# Patient Record
Sex: MALE | Race: WHITE | NOT HISPANIC OR LATINO | URBAN - METROPOLITAN AREA
[De-identification: names, ages, dates, MRNs, and addresses within clinical notes are randomized per-mention and may not be internally consistent; named-entity substitution may affect disease eponyms.]

---

## 2023-04-26 DIAGNOSIS — I10 HTN (HYPERTENSION), BENIGN: Primary | ICD-10-CM

## 2023-04-26 RX ORDER — AMLODIPINE BESYLATE 5 MG/1
5 TABLET ORAL DAILY
Qty: 90 TABLET | Refills: 1 | Status: SHIPPED | OUTPATIENT
Start: 2023-04-26 | End: 2024-01-12 | Stop reason: SDUPTHER

## 2023-04-26 RX ORDER — AMLODIPINE BESYLATE 5 MG/1
1 TABLET ORAL DAILY
COMMUNITY
End: 2023-04-26 | Stop reason: SDUPTHER

## 2023-10-01 NOTE — PROGRESS NOTES
Therapy Diagnosis  Assessed    · Shoulder pain, right (719.41) (M25.511)   · Weakness of shoulder (719.61) (R29.898)    Plan of Care    Goals: Goals set and discussed 9/19/23:  By discharge KIRAN ANDRES will achieve the following goals:     Activity Limitation: Will be able to reach overhead in abduction/ scaption without pain or difficulty for improved function with all daily activities; and improve score on QuickDash by at least 7 points for improved daily function., by week 8   Pain: Will report right shoulder pain no worse than 2-3/10 with sleeping and overhead use., by week 8   Range Of Motion/Joint Mobility: Right shoulder painfree AROM improved to at least 150 deg flexion, scaption, and abduction for improved overhead reaching/ lifting for daily activities., by week 8   Strength: Right UE strength improved to 4+5/5 without focal RTC deficits for improved function with overhead reaching, lifting, carrying., by week 8   HEP, Independent and compliant with appropriate HEP for carryover of PT to meet all goals.  , by week 8     Planned interventions include: cryotherapy, education/instruction, home program, therapeutic activities, therapeutic exercises and ultrasound . Focus will be on HEP development for patient to integrate with his biweekly gym workouts; and PT visits to monitor progress/ update home program.   Frequency and duration: 1 time(s) a week, for 8 weeks . Once /week or every other week based on patient preference and progress.   Potential to achieve rehab goals is good      Plan of care was developed with input and agreement by the patient.

## 2023-11-30 ENCOUNTER — DOCUMENTATION (OUTPATIENT)
Dept: PHYSICAL THERAPY | Facility: CLINIC | Age: 58
End: 2023-11-30
Payer: COMMERCIAL

## 2023-11-30 NOTE — PROGRESS NOTES
Physical Therapy    Discharge Summary    Name: Xander Lorenzo  MRN: [id number]  : 1965  Date: 23    Discharge Summary: PT    Discharge Information: Date of discharge 23, Date of last visit 23, Date of evaluation 23, Number of attended visits 1, Referred by Dr Dean David, and Referred for Rotator Cuff Tendonitis    Therapy Summary: See initial evaluation dated 23.  Xander did not schedule follow ups after his first visit. He was to continue with his HEP as call to schedule if needed.     Discharge Status: Did not return after evaluation; see evaluation for last known status.      Rehab Discharge Reason: Failed to schedule and/or keep follow-up appointment(s)

## 2024-01-12 DIAGNOSIS — I10 HTN (HYPERTENSION), BENIGN: ICD-10-CM

## 2024-01-12 RX ORDER — AMLODIPINE BESYLATE 5 MG/1
5 TABLET ORAL DAILY
Qty: 90 TABLET | Refills: 1 | Status: SHIPPED | OUTPATIENT
Start: 2024-01-12

## 2024-04-12 DIAGNOSIS — E78.2 MIXED HYPERLIPIDEMIA: Primary | ICD-10-CM

## 2024-04-12 RX ORDER — ROSUVASTATIN CALCIUM 10 MG/1
10 TABLET, COATED ORAL DAILY
COMMUNITY
End: 2024-04-12 | Stop reason: SDUPTHER

## 2024-04-12 RX ORDER — ROSUVASTATIN CALCIUM 10 MG/1
10 TABLET, COATED ORAL DAILY
Qty: 90 TABLET | Refills: 0 | Status: SHIPPED | OUTPATIENT
Start: 2024-04-12

## 2024-05-02 ENCOUNTER — OFFICE VISIT (OUTPATIENT)
Dept: PRIMARY CARE | Facility: CLINIC | Age: 59
End: 2024-05-02
Payer: COMMERCIAL

## 2024-05-02 VITALS
WEIGHT: 211 LBS | SYSTOLIC BLOOD PRESSURE: 112 MMHG | OXYGEN SATURATION: 97 % | TEMPERATURE: 97.6 F | HEART RATE: 80 BPM | DIASTOLIC BLOOD PRESSURE: 68 MMHG | HEIGHT: 69 IN | BODY MASS INDEX: 31.25 KG/M2

## 2024-05-02 DIAGNOSIS — E78.2 HYPERLIPEMIA, MIXED: ICD-10-CM

## 2024-05-02 DIAGNOSIS — I10 BENIGN ESSENTIAL HYPERTENSION: ICD-10-CM

## 2024-05-02 DIAGNOSIS — Z00.00 HEALTH MAINTENANCE EXAMINATION: Primary | ICD-10-CM

## 2024-05-02 PROCEDURE — 1036F TOBACCO NON-USER: CPT | Performed by: FAMILY MEDICINE

## 2024-05-02 PROCEDURE — 36415 COLL VENOUS BLD VENIPUNCTURE: CPT

## 2024-05-02 PROCEDURE — 3074F SYST BP LT 130 MM HG: CPT | Performed by: FAMILY MEDICINE

## 2024-05-02 PROCEDURE — 80061 LIPID PANEL: CPT

## 2024-05-02 PROCEDURE — 3078F DIAST BP <80 MM HG: CPT | Performed by: FAMILY MEDICINE

## 2024-05-02 PROCEDURE — 80053 COMPREHEN METABOLIC PANEL: CPT

## 2024-05-02 PROCEDURE — 84153 ASSAY OF PSA TOTAL: CPT

## 2024-05-02 PROCEDURE — 85027 COMPLETE CBC AUTOMATED: CPT

## 2024-05-02 PROCEDURE — 99396 PREV VISIT EST AGE 40-64: CPT | Performed by: FAMILY MEDICINE

## 2024-05-02 ASSESSMENT — ENCOUNTER SYMPTOMS
OCCASIONAL FEELINGS OF UNSTEADINESS: 0
LOSS OF SENSATION IN FEET: 0
DEPRESSION: 0

## 2024-05-02 ASSESSMENT — PATIENT HEALTH QUESTIONNAIRE - PHQ9
1. LITTLE INTEREST OR PLEASURE IN DOING THINGS: NOT AT ALL
SUM OF ALL RESPONSES TO PHQ9 QUESTIONS 1 AND 2: 0
2. FEELING DOWN, DEPRESSED OR HOPELESS: NOT AT ALL

## 2024-05-02 ASSESSMENT — PAIN SCALES - GENERAL: PAINLEVEL: 0-NO PAIN

## 2024-05-03 LAB
ALBUMIN SERPL BCP-MCNC: 4.7 G/DL (ref 3.4–5)
ALP SERPL-CCNC: 45 U/L (ref 33–120)
ALT SERPL W P-5'-P-CCNC: 45 U/L (ref 10–52)
ANION GAP SERPL CALC-SCNC: 16 MMOL/L (ref 10–20)
AST SERPL W P-5'-P-CCNC: 28 U/L (ref 9–39)
BILIRUB SERPL-MCNC: 0.8 MG/DL (ref 0–1.2)
BUN SERPL-MCNC: 13 MG/DL (ref 6–23)
CALCIUM SERPL-MCNC: 9.6 MG/DL (ref 8.6–10.6)
CHLORIDE SERPL-SCNC: 101 MMOL/L (ref 98–107)
CHOLEST SERPL-MCNC: 148 MG/DL (ref 0–199)
CHOLESTEROL/HDL RATIO: 3.9
CO2 SERPL-SCNC: 26 MMOL/L (ref 21–32)
CREAT SERPL-MCNC: 0.94 MG/DL (ref 0.5–1.3)
EGFRCR SERPLBLD CKD-EPI 2021: >90 ML/MIN/1.73M*2
ERYTHROCYTE [DISTWIDTH] IN BLOOD BY AUTOMATED COUNT: 12.9 % (ref 11.5–14.5)
GLUCOSE SERPL-MCNC: 98 MG/DL (ref 74–99)
HCT VFR BLD AUTO: 51.1 % (ref 41–52)
HDLC SERPL-MCNC: 38.1 MG/DL
HGB BLD-MCNC: 17.3 G/DL (ref 13.5–17.5)
LDLC SERPL CALC-MCNC: 71 MG/DL
MCH RBC QN AUTO: 29.7 PG (ref 26–34)
MCHC RBC AUTO-ENTMCNC: 33.9 G/DL (ref 32–36)
MCV RBC AUTO: 88 FL (ref 80–100)
NON HDL CHOLESTEROL: 110 MG/DL (ref 0–149)
NRBC BLD-RTO: 0 /100 WBCS (ref 0–0)
PLATELET # BLD AUTO: 319 X10*3/UL (ref 150–450)
POTASSIUM SERPL-SCNC: 4.8 MMOL/L (ref 3.5–5.3)
PROT SERPL-MCNC: 7.5 G/DL (ref 6.4–8.2)
PSA SERPL-MCNC: 2.98 NG/ML
RBC # BLD AUTO: 5.83 X10*6/UL (ref 4.5–5.9)
SODIUM SERPL-SCNC: 138 MMOL/L (ref 136–145)
TRIGL SERPL-MCNC: 194 MG/DL (ref 0–149)
VLDL: 39 MG/DL (ref 0–40)
WBC # BLD AUTO: 7.3 X10*3/UL (ref 4.4–11.3)

## 2024-10-14 DIAGNOSIS — I10 HTN (HYPERTENSION), BENIGN: ICD-10-CM

## 2024-10-14 RX ORDER — AMLODIPINE BESYLATE 5 MG/1
5 TABLET ORAL DAILY
Qty: 90 TABLET | Refills: 1 | Status: SHIPPED | OUTPATIENT
Start: 2024-10-14

## 2024-10-25 DIAGNOSIS — E78.2 MIXED HYPERLIPIDEMIA: ICD-10-CM

## 2024-10-25 RX ORDER — ROSUVASTATIN CALCIUM 10 MG/1
10 TABLET, COATED ORAL DAILY
Qty: 90 TABLET | Refills: 0 | Status: SHIPPED | OUTPATIENT
Start: 2024-10-25

## 2025-01-01 ENCOUNTER — OFFICE VISIT (OUTPATIENT)
Dept: URGENT CARE | Age: 60
End: 2025-01-01
Payer: COMMERCIAL

## 2025-01-01 VITALS
RESPIRATION RATE: 16 BRPM | HEART RATE: 77 BPM | OXYGEN SATURATION: 96 % | TEMPERATURE: 98.6 F | DIASTOLIC BLOOD PRESSURE: 97 MMHG | SYSTOLIC BLOOD PRESSURE: 158 MMHG

## 2025-01-01 DIAGNOSIS — J01.00 ACUTE NON-RECURRENT MAXILLARY SINUSITIS: Primary | ICD-10-CM

## 2025-01-01 RX ORDER — METHYLPREDNISOLONE 4 MG/1
TABLET ORAL
Qty: 21 TABLET | Refills: 0 | Status: SHIPPED | OUTPATIENT
Start: 2025-01-01

## 2025-01-01 RX ORDER — AMOXICILLIN AND CLAVULANATE POTASSIUM 875; 125 MG/1; MG/1
1 TABLET, FILM COATED ORAL 2 TIMES DAILY
Qty: 20 TABLET | Refills: 0 | Status: SHIPPED | OUTPATIENT
Start: 2025-01-01 | End: 2025-01-11

## 2025-01-01 ASSESSMENT — PAIN SCALES - GENERAL: PAINLEVEL_OUTOF10: 9

## 2025-01-01 NOTE — PROGRESS NOTES
Subjective   Patient ID: Xander Lorenzo is a 59 y.o. male. They present today with a chief complaint of URI and Sinusitis (Head congestion and sinus pressure times 10 days.).    History of Present Illness  60 yo male coming in for sinus congestion and pressure and cough. He states this has been going on for the last 10 days. He states in the beginning of the illness he did have fevers and chills but those are gone now. He denies any shortness of breath. HE denies any other complaints.     Past Medical History  Allergies as of 01/01/2025    (No Known Allergies)       (Not in a hospital admission)       No past medical history on file.    Past Surgical History:   Procedure Laterality Date    OTHER SURGICAL HISTORY  11/18/2020    Appendectomy    OTHER SURGICAL HISTORY  11/18/2020    Hernia repair    OTHER SURGICAL HISTORY  11/18/2020    Tonsillectomy        reports that he has never smoked. He has never used smokeless tobacco. He reports current alcohol use. He reports that he does not use drugs.    Review of Systems  Review of Systems:  General: No weight loss, fatigue, anorexia, insomnia, fever, chills.  ENT: No pharyngitis, dry mouth, positive nasal congestion and sinus pressure, no ear pain  Cardiac: No chest pain, palpitations, syncope, near syncope.  Pulmonary:  No shortness of breath, positive cough, no hemoptysis  Heme/lymph: No swollen glands, fever, bleeding  Musculoskeletal: No limb pain, joint pain, joint swelling.  Skin: No rashes  Neuro: No numbness, tingling, headaches                                 Objective    Vitals:    01/01/25 0903   BP: (!) 158/97   Pulse: 77   Resp: 16   Temp: 37 °C (98.6 °F)   SpO2: 96%     No LMP for male patient.    Physical Exam  Physical Exam:  General: Vital noted, no distress. Afebrile  EENT: Eyes unremarkable, Pupils PERRLA, EOMs intact. TMs unremarkable. Posterior oropharynx unremarkable. Uvula in the midline and non-edematous. No PTA. No retropharyngeal mass. No Live's  angina.  Cardiac: Regular rate and rhythm, no murmur  Pulmonary: Lungs clear bilaterally with good aeration. No adventitious breath sounds.  Skin: No rashes      Procedures    Point of Care Test & Imaging Results from this visit  No results found for this visit on 01/01/25.   No results found.    Diagnostic study results (if any) were reviewed by CRISTINA Francisco.    Assessment/Plan   Allergies, medications, history, and pertinent labs/EKGs/Imaging reviewed by CRISTINA Fracnisco.     Medical Decision Making  Treatment: Augmentin and medrol dose pack prescribed  Differential: 1) sinusitis , 2) uri , 3) bronchitis  Plan: Patient will follow up with the PCP in the next 2-3 days. Return for any worsening symptoms or go to the ER for further evaluation. Patient understands return precautions and discharge insturctions.  Impression:   1) sinusitis    Orders and Diagnoses  Diagnoses and all orders for this visit:  Acute non-recurrent maxillary sinusitis  -     amoxicillin-pot clavulanate (Augmentin) 875-125 mg tablet; Take 1 tablet by mouth 2 times a day for 10 days.  -     methylPREDNISolone (Medrol Dospak) 4 mg tablets; Follow schedule on package instructions      Medical Admin Record      Patient disposition: Home    Electronically signed by CRISTINA Francisco  9:32 AM

## 2025-04-25 DIAGNOSIS — E78.2 MIXED HYPERLIPIDEMIA: ICD-10-CM

## 2025-04-25 RX ORDER — ROSUVASTATIN CALCIUM 10 MG/1
10 TABLET, COATED ORAL DAILY
Qty: 90 TABLET | Refills: 0 | Status: SHIPPED | OUTPATIENT
Start: 2025-04-25

## 2025-07-27 DIAGNOSIS — E78.2 MIXED HYPERLIPIDEMIA: ICD-10-CM

## 2025-07-28 DIAGNOSIS — I10 HTN (HYPERTENSION), BENIGN: ICD-10-CM

## 2025-07-28 RX ORDER — AMLODIPINE BESYLATE 5 MG/1
5 TABLET ORAL DAILY
Qty: 90 TABLET | Refills: 1 | Status: SHIPPED | OUTPATIENT
Start: 2025-07-28

## 2025-07-28 RX ORDER — ROSUVASTATIN CALCIUM 10 MG/1
10 TABLET, COATED ORAL DAILY
Qty: 90 TABLET | Refills: 0 | Status: SHIPPED | OUTPATIENT
Start: 2025-07-28

## 2025-11-06 ENCOUNTER — APPOINTMENT (OUTPATIENT)
Dept: PRIMARY CARE | Facility: CLINIC | Age: 60
End: 2025-11-06
Payer: COMMERCIAL